# Patient Record
Sex: FEMALE | Race: WHITE | NOT HISPANIC OR LATINO | Employment: FULL TIME | URBAN - METROPOLITAN AREA
[De-identification: names, ages, dates, MRNs, and addresses within clinical notes are randomized per-mention and may not be internally consistent; named-entity substitution may affect disease eponyms.]

---

## 2023-06-12 PROBLEM — R06.02 SHORTNESS OF BREATH: Status: ACTIVE | Noted: 2023-06-12

## 2023-06-12 PROBLEM — L30.1 DYSHIDROTIC FOOT DERMATITIS: Status: ACTIVE | Noted: 2023-06-12

## 2023-06-12 PROBLEM — B37.31 CANDIDIASIS, VAGINA: Status: RESOLVED | Noted: 2023-06-12 | Resolved: 2023-06-12

## 2023-06-12 PROBLEM — F41.9 ANXIETY DISORDER: Status: ACTIVE | Noted: 2023-06-12

## 2023-06-12 PROBLEM — R11.2 NAUSEA AND/OR VOMITING: Status: ACTIVE | Noted: 2023-06-12

## 2023-06-12 RX ORDER — NORELGESTROMIN AND ETHINYL ESTRADIOL 150; 35 UG/D; UG/D
PATCH TRANSDERMAL
COMMUNITY
Start: 2021-07-27

## 2023-06-12 RX ORDER — ONDANSETRON 4 MG/1
TABLET, ORALLY DISINTEGRATING ORAL
COMMUNITY
Start: 2022-02-10

## 2023-06-12 RX ORDER — CLOBETASOL PROPIONATE 0.5 MG/G
OINTMENT TOPICAL
COMMUNITY
Start: 2022-04-20 | End: 2024-02-10 | Stop reason: WASHOUT

## 2023-06-12 RX ORDER — ALBUTEROL SULFATE 90 UG/1
2 AEROSOL, METERED RESPIRATORY (INHALATION) EVERY 4 HOURS PRN
COMMUNITY
Start: 2022-02-07 | End: 2024-02-10 | Stop reason: WASHOUT

## 2023-06-12 RX ORDER — FLUCONAZOLE 150 MG/1
TABLET ORAL
COMMUNITY
Start: 2022-02-07 | End: 2024-02-10 | Stop reason: WASHOUT

## 2023-06-12 RX ORDER — CLINDAMYCIN PHOSPHATE 20 MG/G
CREAM VAGINAL
COMMUNITY
Start: 2022-02-07 | End: 2024-02-10 | Stop reason: WASHOUT

## 2023-06-12 RX ORDER — HYDROCORTISONE 1 %
CREAM (GRAM) TOPICAL
COMMUNITY
Start: 2022-08-16 | End: 2024-02-10 | Stop reason: WASHOUT

## 2023-06-13 ENCOUNTER — OFFICE VISIT (OUTPATIENT)
Dept: PRIMARY CARE | Facility: CLINIC | Age: 23
End: 2023-06-13
Payer: COMMERCIAL

## 2023-06-13 VITALS
SYSTOLIC BLOOD PRESSURE: 130 MMHG | DIASTOLIC BLOOD PRESSURE: 70 MMHG | WEIGHT: 137 LBS | HEIGHT: 67 IN | BODY MASS INDEX: 21.5 KG/M2 | HEART RATE: 83 BPM | OXYGEN SATURATION: 98 % | TEMPERATURE: 97.9 F

## 2023-06-13 DIAGNOSIS — Z23 NEED FOR HEPATITIS B BOOSTER VACCINATION: Primary | ICD-10-CM

## 2023-06-13 PROCEDURE — 90739 HEPB VACC 2/4 DOSE ADULT IM: CPT | Performed by: FAMILY MEDICINE

## 2023-06-13 PROCEDURE — 99212 OFFICE O/P EST SF 10 MIN: CPT | Performed by: FAMILY MEDICINE

## 2023-06-13 PROCEDURE — 90471 IMMUNIZATION ADMIN: CPT | Performed by: FAMILY MEDICINE

## 2023-06-13 ASSESSMENT — ENCOUNTER SYMPTOMS
JOINT SWELLING: 0
BLOOD IN STOOL: 0
VOMITING: 0
SHORTNESS OF BREATH: 0
DECREASED CONCENTRATION: 0
WEAKNESS: 0
FEVER: 0
COUGH: 0
HEADACHES: 0
EYE PAIN: 0
DYSURIA: 0
FATIGUE: 0
NUMBNESS: 0
HALLUCINATIONS: 0
SORE THROAT: 0
NAUSEA: 0
FREQUENCY: 0
ABDOMINAL PAIN: 0
PALPITATIONS: 0
CONFUSION: 0
DIARRHEA: 0
DIZZINESS: 0
HEMATURIA: 0
TROUBLE SWALLOWING: 0
UNEXPECTED WEIGHT CHANGE: 0

## 2023-06-13 ASSESSMENT — PAIN SCALES - GENERAL: PAINLEVEL: 0-NO PAIN

## 2023-06-13 NOTE — PROGRESS NOTES
Subjective   Patient ID: Zoya Savage is a 22 y.o. female.    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        Review of Systems   Constitutional:  Negative for fatigue, fever and unexpected weight change.   HENT:  Negative for congestion, ear pain, hearing loss, sore throat and trouble swallowing.    Eyes:  Negative for pain and visual disturbance.   Respiratory:  Negative for cough and shortness of breath.    Cardiovascular:  Negative for chest pain, palpitations and leg swelling.   Gastrointestinal:  Negative for abdominal pain, blood in stool, diarrhea, nausea and vomiting.   Genitourinary:  Negative for dysuria, frequency, hematuria and urgency.   Musculoskeletal:  Negative for joint swelling.   Skin:  Negative for pallor and rash.   Neurological:  Negative for dizziness, syncope, weakness, numbness and headaches.   Psychiatric/Behavioral:  Negative for confusion, decreased concentration, hallucinations and suicidal ideas.      Vitals:    06/13/23 1251   BP: 130/70   Pulse: 83   Temp: 36.6 °C (97.9 °F)   SpO2: 98%      Objective   Physical Exam  Constitutional:       Appearance: Normal appearance.   Cardiovascular:      Rate and Rhythm: Normal rate and regular rhythm.      Heart sounds: Normal heart sounds.   Pulmonary:      Effort: Pulmonary effort is normal.      Breath sounds: Normal breath sounds.   Musculoskeletal:      Cervical back: Neck supple.   Skin:     General: Skin is warm and dry.   Neurological:      General: No focal deficit present.      Mental Status: She is alert.   Psychiatric:         Mood and Affect: Mood normal.         Speech: Speech normal.         Behavior: Behavior normal.         Cognition and Memory: Cognition normal.         Assessment/Plan   There are no diagnoses linked to this encounter.

## 2023-08-08 ENCOUNTER — OFFICE VISIT (OUTPATIENT)
Dept: PRIMARY CARE | Facility: CLINIC | Age: 23
End: 2023-08-08
Payer: COMMERCIAL

## 2023-08-08 VITALS
BODY MASS INDEX: 21.35 KG/M2 | DIASTOLIC BLOOD PRESSURE: 70 MMHG | HEART RATE: 87 BPM | SYSTOLIC BLOOD PRESSURE: 110 MMHG | WEIGHT: 136 LBS | OXYGEN SATURATION: 97 % | HEIGHT: 67 IN | TEMPERATURE: 97 F

## 2023-08-08 DIAGNOSIS — N89.8 VAGINAL ODOR: ICD-10-CM

## 2023-08-08 LAB
POC APPEARANCE, URINE: CLEAR
POC BILIRUBIN, URINE: NEGATIVE
POC BLOOD, URINE: ABNORMAL
POC COLOR, URINE: ABNORMAL
POC GLUCOSE, URINE: NEGATIVE MG/DL
POC KETONES, URINE: NEGATIVE MG/DL
POC LEUKOCYTES, URINE: NEGATIVE
POC NITRITE,URINE: NEGATIVE
POC PH, URINE: 6 PH
POC PROTEIN, URINE: NEGATIVE MG/DL
POC SPECIFIC GRAVITY, URINE: 1.02
POC UROBILINOGEN, URINE: 0.2 EU/DL

## 2023-08-08 PROCEDURE — 81003 URINALYSIS AUTO W/O SCOPE: CPT | Performed by: INTERNAL MEDICINE

## 2023-08-08 PROCEDURE — 99212 OFFICE O/P EST SF 10 MIN: CPT | Performed by: INTERNAL MEDICINE

## 2023-08-08 RX ORDER — FLUCONAZOLE 150 MG/1
150 TABLET ORAL DAILY
Qty: 3 TABLET | Refills: 0 | Status: SHIPPED | OUTPATIENT
Start: 2023-08-08 | End: 2023-08-11

## 2023-08-08 ASSESSMENT — ENCOUNTER SYMPTOMS
NECK PAIN: 0
POLYPHAGIA: 0
DYSURIA: 0
ABDOMINAL PAIN: 0
FATIGUE: 0
PHOTOPHOBIA: 0
STRIDOR: 0
VOMITING: 0
CONFUSION: 0
APPETITE CHANGE: 0
FREQUENCY: 1
BACK PAIN: 0
WEAKNESS: 0
NERVOUS/ANXIOUS: 0
ACTIVITY CHANGE: 0
CONSTIPATION: 0
VOICE CHANGE: 0
SHORTNESS OF BREATH: 0
NUMBNESS: 0
SPEECH DIFFICULTY: 0
SLEEP DISTURBANCE: 0
SINUS PAIN: 0
POLYDIPSIA: 0
MYALGIAS: 0
SORE THROAT: 0
FEVER: 0
HEADACHES: 0
SEIZURES: 0
HALLUCINATIONS: 0
EYE PAIN: 0
BLOOD IN STOOL: 0
DIARRHEA: 0
FLANK PAIN: 0
WHEEZING: 0
UNEXPECTED WEIGHT CHANGE: 0
ADENOPATHY: 0
PALPITATIONS: 0
NAUSEA: 0
CHEST TIGHTNESS: 0
TROUBLE SWALLOWING: 0
WOUND: 0
DIZZINESS: 0
FACIAL ASYMMETRY: 0

## 2023-08-08 ASSESSMENT — PAIN SCALES - GENERAL: PAINLEVEL: 0-NO PAIN

## 2023-08-08 NOTE — PROGRESS NOTES
"Subjective   Patient ID: Zoya Savage is a 23 y.o. female who presents for UTI (C/o vaginal odor past week, itching, uti concern).    UTI   Associated symptoms include frequency and urgency. Pertinent negatives include no flank pain, nausea or vomiting.      Patient presented to the office for Vaginal odor, itching and frequency.    Review of Systems   Constitutional:  Negative for activity change, appetite change, fatigue, fever and unexpected weight change.   HENT:  Negative for dental problem, ear discharge, hearing loss, nosebleeds, postnasal drip, sinus pain, sore throat, trouble swallowing and voice change.    Eyes:  Negative for photophobia, pain and visual disturbance.   Respiratory:  Negative for chest tightness, shortness of breath, wheezing and stridor.    Cardiovascular:  Negative for chest pain, palpitations and leg swelling.   Gastrointestinal:  Negative for abdominal pain, blood in stool, constipation, diarrhea, nausea and vomiting.   Endocrine: Negative for polydipsia, polyphagia and polyuria.   Genitourinary:  Positive for frequency, urgency and vaginal discharge. Negative for decreased urine volume, dyspareunia, dysuria and flank pain.   Musculoskeletal:  Negative for back pain, gait problem, myalgias and neck pain.   Skin:  Negative for rash and wound.   Allergic/Immunologic: Negative for environmental allergies and food allergies.   Neurological:  Negative for dizziness, seizures, syncope, facial asymmetry, speech difficulty, weakness, numbness and headaches.   Hematological:  Negative for adenopathy.   Psychiatric/Behavioral:  Negative for behavioral problems, confusion, hallucinations, sleep disturbance and suicidal ideas. The patient is not nervous/anxious.      Objective   /70   Pulse 87   Temp 36.1 °C (97 °F)   Ht 1.695 m (5' 6.75\")   Wt 61.7 kg (136 lb)   SpO2 97%   BMI 21.46 kg/m²     Physical Exam  Constitutional:       General: She is not in acute distress.     " Appearance: Normal appearance. She is normal weight. She is not ill-appearing or toxic-appearing.   HENT:      Nose: Nose normal.   Eyes:      Conjunctiva/sclera: Conjunctivae normal.   Pulmonary:      Effort: Pulmonary effort is normal.   Musculoskeletal:         General: Normal range of motion.   Neurological:      General: No focal deficit present.      Mental Status: She is alert and oriented to person, place, and time.   Psychiatric:         Mood and Affect: Mood normal.         Behavior: Behavior normal.         Thought Content: Thought content normal.         Judgment: Judgment normal.       Assessment/Plan   Problem List Items Addressed This Visit    None  Visit Diagnoses       Vaginal odor        Relevant Medications    fluconazole (Diflucan) 150 mg tablet    Other Relevant Orders    POCT UA Automated manually resulted (Completed)  In office UA is negative for UTI. Patient instructed to follow up with OB- GYN and given 3 days of Diflucan.

## 2023-08-24 LAB
CHLAMYDIA TRACH., AMPLIFIED: NEGATIVE
CLUE CELLS: NORMAL
N. GONORRHEA, AMPLIFIED: NEGATIVE
NUGENT SCORE: 2
YEAST: NORMAL

## 2023-12-28 ENCOUNTER — TELEPHONE (OUTPATIENT)
Dept: PRIMARY CARE | Facility: CLINIC | Age: 23
End: 2023-12-28
Payer: COMMERCIAL

## 2023-12-28 NOTE — TELEPHONE ENCOUNTER
Pt called stated that she is having a burning sensation during intercourse and no other sx and wanted to know what she should do? She has appt with GYN on 1/16/2024 but she feels she shouldn't wait that long. Please advise.  No discharge, no odor.

## 2024-01-16 PROCEDURE — 88141 CYTOPATH C/V INTERPRET: CPT | Performed by: PATHOLOGY

## 2024-01-16 PROCEDURE — 87624 HPV HI-RISK TYP POOLED RSLT: CPT

## 2024-01-16 PROCEDURE — 88175 CYTOPATH C/V AUTO FLUID REDO: CPT

## 2024-01-17 ENCOUNTER — LAB REQUISITION (OUTPATIENT)
Dept: LAB | Facility: HOSPITAL | Age: 24
End: 2024-01-17
Payer: COMMERCIAL

## 2024-01-17 DIAGNOSIS — Z01.419 ENCOUNTER FOR GYNECOLOGICAL EXAMINATION (GENERAL) (ROUTINE) WITHOUT ABNORMAL FINDINGS: ICD-10-CM

## 2024-01-17 DIAGNOSIS — Z11.51 ENCOUNTER FOR SCREENING FOR HUMAN PAPILLOMAVIRUS (HPV): ICD-10-CM

## 2024-01-31 ENCOUNTER — OFFICE VISIT (OUTPATIENT)
Dept: PRIMARY CARE | Facility: CLINIC | Age: 24
End: 2024-01-31
Payer: COMMERCIAL

## 2024-01-31 VITALS
SYSTOLIC BLOOD PRESSURE: 120 MMHG | HEART RATE: 65 BPM | OXYGEN SATURATION: 98 % | DIASTOLIC BLOOD PRESSURE: 74 MMHG | HEIGHT: 66 IN | WEIGHT: 133 LBS | TEMPERATURE: 96.8 F | BODY MASS INDEX: 21.38 KG/M2

## 2024-01-31 DIAGNOSIS — M94.0 ACUTE COSTOCHONDRITIS: Primary | ICD-10-CM

## 2024-01-31 PROCEDURE — 99213 OFFICE O/P EST LOW 20 MIN: CPT | Performed by: INTERNAL MEDICINE

## 2024-01-31 RX ORDER — DICLOFENAC SODIUM 50 MG/1
50 TABLET, DELAYED RELEASE ORAL 2 TIMES DAILY
Qty: 14 TABLET | Refills: 0 | Status: SHIPPED | OUTPATIENT
Start: 2024-01-31 | End: 2024-02-07

## 2024-01-31 ASSESSMENT — ENCOUNTER SYMPTOMS
JOINT SWELLING: 0
ADENOPATHY: 0
TREMORS: 0
ACTIVITY CHANGE: 0
COLOR CHANGE: 0
CONSTIPATION: 0
POLYPHAGIA: 0
VOICE CHANGE: 0
STRIDOR: 0
SINUS PAIN: 0
NERVOUS/ANXIOUS: 0
ARTHRALGIAS: 0
WEAKNESS: 0
SLEEP DISTURBANCE: 0
FATIGUE: 0
HALLUCINATIONS: 0
NECK PAIN: 0
UNEXPECTED WEIGHT CHANGE: 0
WOUND: 0
ABDOMINAL PAIN: 0
FEVER: 0
BLOOD IN STOOL: 0
PHOTOPHOBIA: 0
PALPITATIONS: 0
SEIZURES: 0
FLANK PAIN: 0
SHORTNESS OF BREATH: 0
CHEST TIGHTNESS: 0
MYALGIAS: 0
SORE THROAT: 0
SPEECH DIFFICULTY: 0
WHEEZING: 0
COUGH: 1
POLYDIPSIA: 0
VOMITING: 0
EYE PAIN: 0
DIARRHEA: 0
HEADACHES: 0
TROUBLE SWALLOWING: 0
FACIAL ASYMMETRY: 0
CONFUSION: 0
APPETITE CHANGE: 0
BACK PAIN: 0
DIZZINESS: 0
NUMBNESS: 0
NAUSEA: 0
DYSURIA: 0
HEMATURIA: 0

## 2024-01-31 NOTE — LETTER
January 31, 2024     Patient: Zoya Savage   YOB: 2000   Date of Visit: 1/31/2024       To Whom It May Concern:    Zoya Savage was seen in my clinic on 1/31/2024 at 12:30 pm. Please excuse Zoya for her absence from work on this day to make the appointment.    If you have any questions or concerns, please don't hesitate to call.         Sincerely,         Maurice Porter MD        CC: No Recipients

## 2024-01-31 NOTE — PROGRESS NOTES
"Subjective   Patient ID: Zoya Savage is a 23 y.o. female who presents for Chest Pain.    HPI   Patient has on and off cough from past 1 month and few days ago she developed left lower side chest pain which is excruciating with coughing and deep breathing.  Pain is worse with lifting and with any activity involving left hand.    Review of Systems   Constitutional:  Negative for activity change, appetite change, fatigue, fever and unexpected weight change.   HENT:  Negative for dental problem, ear discharge, hearing loss, nosebleeds, postnasal drip, sinus pain, sore throat, trouble swallowing and voice change.    Eyes:  Negative for photophobia, pain and visual disturbance.   Respiratory:  Positive for cough. Negative for chest tightness, shortness of breath, wheezing and stridor.    Cardiovascular:  Positive for chest pain. Negative for palpitations and leg swelling.   Gastrointestinal:  Negative for abdominal pain, blood in stool, constipation, diarrhea, nausea and vomiting.   Endocrine: Negative for polydipsia, polyphagia and polyuria.   Genitourinary:  Negative for decreased urine volume, dyspareunia, dysuria, flank pain, hematuria and urgency.   Musculoskeletal:  Negative for arthralgias, back pain, gait problem, joint swelling, myalgias and neck pain.   Skin:  Negative for color change, rash and wound.   Allergic/Immunologic: Negative for environmental allergies and food allergies.   Neurological:  Negative for dizziness, tremors, seizures, syncope, facial asymmetry, speech difficulty, weakness, numbness and headaches.   Hematological:  Negative for adenopathy.   Psychiatric/Behavioral:  Negative for behavioral problems, confusion, hallucinations, self-injury, sleep disturbance and suicidal ideas. The patient is not nervous/anxious.      Objective   /74   Pulse 65   Temp 36 °C (96.8 °F)   Ht 1.676 m (5' 6\")   Wt 60.3 kg (133 lb)   SpO2 98%   BMI 21.47 kg/m²     Physical Exam  Constitutional:  "      General: She is not in acute distress.     Appearance: She is not ill-appearing or toxic-appearing.   Eyes:      Conjunctiva/sclera: Conjunctivae normal.   Cardiovascular:      Rate and Rhythm: Normal rate and regular rhythm.      Pulses: Normal pulses.      Heart sounds: Normal heart sounds. No murmur heard.  Pulmonary:      Effort: Pulmonary effort is normal. No respiratory distress.      Breath sounds: Normal breath sounds. No stridor. No wheezing, rhonchi or rales.   Chest:      Chest wall: Tenderness present.   Musculoskeletal:         General: No swelling or deformity. Normal range of motion.      Cervical back: Normal range of motion.   Neurological:      General: No focal deficit present.      Mental Status: She is alert and oriented to person, place, and time.   Psychiatric:         Mood and Affect: Mood normal.         Behavior: Behavior normal.       Assessment/Plan   Problem List Items Addressed This Visit    None  Visit Diagnoses       Acute costochondritis    -  Primary    Relevant Medications    diclofenac (Voltaren) 50 mg EC tablet

## 2024-02-01 ENCOUNTER — APPOINTMENT (OUTPATIENT)
Dept: PRIMARY CARE | Facility: CLINIC | Age: 24
End: 2024-02-01
Payer: COMMERCIAL

## 2024-02-02 LAB
CYTOLOGY CMNT CVX/VAG CYTO-IMP: NORMAL
HPV HR 12 DNA GENITAL QL NAA+PROBE: NEGATIVE
HPV HR GENOTYPES PNL CVX NAA+PROBE: NEGATIVE
HPV16 DNA SPEC QL NAA+PROBE: NEGATIVE
HPV18 DNA SPEC QL NAA+PROBE: NEGATIVE
LAB AP HPV GENOTYPE QUESTION: YES
LAB AP HPV HR: NORMAL
LABORATORY COMMENT REPORT: NORMAL
PATH REPORT.TOTAL CANCER: NORMAL

## 2024-02-09 ENCOUNTER — OFFICE VISIT (OUTPATIENT)
Dept: PRIMARY CARE | Facility: CLINIC | Age: 24
End: 2024-02-09
Payer: COMMERCIAL

## 2024-02-09 VITALS
BODY MASS INDEX: 20.72 KG/M2 | SYSTOLIC BLOOD PRESSURE: 100 MMHG | HEIGHT: 67 IN | TEMPERATURE: 97.6 F | WEIGHT: 132 LBS | DIASTOLIC BLOOD PRESSURE: 60 MMHG

## 2024-02-09 DIAGNOSIS — R10.9 FLANK PAIN: Primary | ICD-10-CM

## 2024-02-09 PROCEDURE — 99212 OFFICE O/P EST SF 10 MIN: CPT | Performed by: INTERNAL MEDICINE

## 2024-02-09 ASSESSMENT — PAIN SCALES - GENERAL: PAINLEVEL: 1

## 2024-02-10 PROBLEM — R10.9 ABDOMINAL PAIN: Status: RESOLVED | Noted: 2024-02-10 | Resolved: 2024-02-10

## 2024-02-10 PROBLEM — R10.9 ABDOMINAL PAIN: Status: ACTIVE | Noted: 2024-02-10

## 2024-02-10 ASSESSMENT — ENCOUNTER SYMPTOMS
NAUSEA: 0
COLOR CHANGE: 0
PHOTOPHOBIA: 0
VOICE CHANGE: 0
CHEST TIGHTNESS: 0
WEAKNESS: 0
WHEEZING: 0
NERVOUS/ANXIOUS: 0
HALLUCINATIONS: 0
ARTHRALGIAS: 0
NECK PAIN: 0
CONFUSION: 0
STRIDOR: 0
FLANK PAIN: 0
HEADACHES: 0
FATIGUE: 0
JOINT SWELLING: 0
FREQUENCY: 0
SLEEP DISTURBANCE: 0
SINUS PAIN: 0
HEMATURIA: 0
ADENOPATHY: 0
SHORTNESS OF BREATH: 0
FEVER: 0
DIZZINESS: 0
UNEXPECTED WEIGHT CHANGE: 0
POLYDIPSIA: 0
TREMORS: 0
APPETITE CHANGE: 0
MYALGIAS: 0
EYE PAIN: 0
FACIAL ASYMMETRY: 0
COUGH: 0
SORE THROAT: 0
CONSTIPATION: 0
SPEECH DIFFICULTY: 0
DYSURIA: 0
ACTIVITY CHANGE: 0
PALPITATIONS: 0
TROUBLE SWALLOWING: 0
DIARRHEA: 0
WOUND: 0
NUMBNESS: 0
SEIZURES: 0
BACK PAIN: 0
ABDOMINAL PAIN: 0
VOMITING: 0
POLYPHAGIA: 0
BLOOD IN STOOL: 0

## 2024-02-10 NOTE — PROGRESS NOTES
"Subjective   Patient ID: Zoya Savage is a 23 y.o. female who presents for LEFT SIDE PAIN (Needing paperwork for employer to return because of restrictions).    HPI   Patient presented to the office after a recent ER visit for Left sided flank pain. She was tested in the ER with blood and urine tests and they were normal.  She was given injection of Toradol and pain resolved. No problem since then. She need paperwork to return to work.    Review of Systems   Constitutional:  Negative for activity change, appetite change, fatigue, fever and unexpected weight change.   HENT:  Negative for dental problem, ear discharge, hearing loss, nosebleeds, postnasal drip, sinus pain, sore throat, trouble swallowing and voice change.    Eyes:  Negative for photophobia, pain and visual disturbance.   Respiratory:  Negative for cough, chest tightness, shortness of breath, wheezing and stridor.    Cardiovascular:  Negative for chest pain, palpitations and leg swelling.   Gastrointestinal:  Negative for abdominal pain, blood in stool, constipation, diarrhea, nausea and vomiting.   Endocrine: Negative for polydipsia, polyphagia and polyuria.   Genitourinary:  Negative for decreased urine volume, dyspareunia, dysuria, flank pain, frequency, hematuria and urgency.   Musculoskeletal:  Negative for arthralgias, back pain, gait problem, joint swelling, myalgias and neck pain.   Skin:  Negative for color change, rash and wound.   Allergic/Immunologic: Negative for environmental allergies and food allergies.   Neurological:  Negative for dizziness, tremors, seizures, syncope, facial asymmetry, speech difficulty, weakness, numbness and headaches.   Hematological:  Negative for adenopathy.   Psychiatric/Behavioral:  Negative for behavioral problems, confusion, hallucinations, self-injury, sleep disturbance and suicidal ideas. The patient is not nervous/anxious.      Objective   /60   Temp 36.4 °C (97.6 °F)   Ht 1.695 m (5' 6.75\")  "  Wt 59.9 kg (132 lb)   BMI 20.83 kg/m²     Physical Exam  Constitutional:       General: She is not in acute distress.     Appearance: Normal appearance. She is normal weight. She is not ill-appearing or toxic-appearing.   HENT:      Head: Normocephalic.      Nose: Nose normal.   Eyes:      Conjunctiva/sclera: Conjunctivae normal.   Pulmonary:      Effort: Pulmonary effort is normal.   Abdominal:      General: Bowel sounds are normal.      Tenderness: There is no abdominal tenderness. There is no right CVA tenderness or left CVA tenderness.   Musculoskeletal:         General: Normal range of motion.      Cervical back: Normal range of motion.   Skin:     General: Skin is warm.   Neurological:      General: No focal deficit present.      Mental Status: She is alert and oriented to person, place, and time.   Psychiatric:         Mood and Affect: Mood normal.         Behavior: Behavior normal.       Assessment/Plan   Problem List Items Addressed This Visit          Gastrointestinal and Abdominal    Flank pain - Primary     Flank pain has resolved now.   Paperwork has been signed.

## 2024-02-13 ENCOUNTER — APPOINTMENT (OUTPATIENT)
Dept: PRIMARY CARE | Facility: CLINIC | Age: 24
End: 2024-02-13
Payer: COMMERCIAL

## 2024-10-23 ENCOUNTER — APPOINTMENT (OUTPATIENT)
Dept: PRIMARY CARE | Facility: CLINIC | Age: 24
End: 2024-10-23
Payer: COMMERCIAL

## 2024-11-20 ENCOUNTER — APPOINTMENT (OUTPATIENT)
Dept: PRIMARY CARE | Facility: CLINIC | Age: 24
End: 2024-11-20
Payer: COMMERCIAL

## 2024-11-20 VITALS
HEART RATE: 72 BPM | DIASTOLIC BLOOD PRESSURE: 60 MMHG | OXYGEN SATURATION: 100 % | TEMPERATURE: 96.8 F | SYSTOLIC BLOOD PRESSURE: 100 MMHG | BODY MASS INDEX: 20.56 KG/M2 | HEIGHT: 67 IN | WEIGHT: 131 LBS

## 2024-11-20 DIAGNOSIS — B35.4 TINEA CORPORIS: ICD-10-CM

## 2024-11-20 DIAGNOSIS — Z00.00 ROUTINE GENERAL MEDICAL EXAMINATION AT A HEALTH CARE FACILITY: Primary | ICD-10-CM

## 2024-11-20 PROBLEM — N89.8 VAGINAL ODOR: Status: ACTIVE | Noted: 2024-11-20

## 2024-11-20 PROBLEM — L98.9 INFLAMMATORY DERMATOSIS: Status: ACTIVE | Noted: 2023-06-12

## 2024-11-20 PROBLEM — M94.0 COSTOCHONDRITIS: Status: ACTIVE | Noted: 2024-11-20

## 2024-11-20 PROBLEM — M62.838 MUSCLE SPASMS OF NECK: Status: ACTIVE | Noted: 2024-11-20

## 2024-11-20 PROCEDURE — 3008F BODY MASS INDEX DOCD: CPT | Performed by: FAMILY MEDICINE

## 2024-11-20 PROCEDURE — 99395 PREV VISIT EST AGE 18-39: CPT | Performed by: FAMILY MEDICINE

## 2024-11-20 RX ORDER — NORELGESTROMIN AND ETHINYL ESTRADIOL 35; 150 UG/MG; UG/MG
1 PATCH TRANSDERMAL WEEKLY
Qty: 9 PATCH | Refills: 3 | Status: SHIPPED | OUTPATIENT
Start: 2024-11-20

## 2024-11-20 RX ORDER — KETOCONAZOLE 20 MG/G
CREAM TOPICAL 2 TIMES DAILY
Qty: 45 G | Refills: 0 | Status: SHIPPED | OUTPATIENT
Start: 2024-11-20 | End: 2025-11-20

## 2024-11-20 RX ORDER — OMEPRAZOLE 20 MG/1
20 CAPSULE, DELAYED RELEASE ORAL
COMMUNITY
Start: 2024-02-01

## 2024-11-20 ASSESSMENT — ENCOUNTER SYMPTOMS
FREQUENCY: 0
HEMATURIA: 0
TROUBLE SWALLOWING: 0
DIARRHEA: 0
COUGH: 0
ABDOMINAL PAIN: 0
SHORTNESS OF BREATH: 0
WEAKNESS: 0
VOMITING: 0
UNEXPECTED WEIGHT CHANGE: 0
PALPITATIONS: 0
HALLUCINATIONS: 0
FATIGUE: 0
NUMBNESS: 0
JOINT SWELLING: 0
HEADACHES: 0
CONFUSION: 0
EYE PAIN: 0
DECREASED CONCENTRATION: 0
DIZZINESS: 0
BLOOD IN STOOL: 0
NAUSEA: 0
DYSURIA: 0
FEVER: 0
SORE THROAT: 0

## 2024-11-20 ASSESSMENT — PAIN SCALES - GENERAL: PAINLEVEL_OUTOF10: 0-NO PAIN

## 2024-11-20 ASSESSMENT — PATIENT HEALTH QUESTIONNAIRE - PHQ9
SUM OF ALL RESPONSES TO PHQ9 QUESTIONS 1 AND 2: 0
1. LITTLE INTEREST OR PLEASURE IN DOING THINGS: NOT AT ALL
2. FEELING DOWN, DEPRESSED OR HOPELESS: NOT AT ALL

## 2024-11-20 NOTE — PATIENT INSTRUCTIONS
It was nice to see you today!  Discussed current concerns and addressed   Reviewed recent labs and diagnostics  Reviewed medications list  Continue to eat a healthy diet, exercise at least 3 times a week or more  Plan and follow up discussed  For any further information related to your condition, copy and paste or go to [email or web address]  We will consider Gardasil vaccine

## 2024-11-20 NOTE — PROGRESS NOTES
Subjective   Patient ID: Zoya Savage is a 24 y.o. female.    Zoya Savage comes in today for physical exam.  There has been no chest pain, shortness of breath, fever, chills, unexplained weight loss, rectal bleeding or any other unusual symptoms.  Menstrual cycles normal, on birth control patches.  Recent labs, diagnostics and pertinent information has been reviewed. Patient is attempting to eat a healthy diet and incorporate exercise in to their lifestyle. Patient does not smoke. Alcohol in moderation. Patient is practicing routine eye and dental care. Reviewed employment status.  Recently got a new job in [email or web address] and she is very happy.  No uncontrolled anxiety, depression. Reviewed current medications, if any.          Review of Systems   Constitutional:  Negative for fatigue, fever and unexpected weight change.   HENT:  Negative for congestion, ear pain, hearing loss, sore throat and trouble swallowing.    Eyes:  Negative for pain and visual disturbance.   Respiratory:  Negative for cough and shortness of breath.    Cardiovascular:  Negative for chest pain, palpitations and leg swelling.   Gastrointestinal:  Negative for abdominal pain, blood in stool, diarrhea, nausea and vomiting.   Genitourinary:  Negative for dysuria, frequency, hematuria and urgency.   Musculoskeletal:  Negative for joint swelling.   Skin:  Negative for pallor and rash.   Neurological:  Negative for dizziness, syncope, weakness, numbness and headaches.   Psychiatric/Behavioral:  Negative for confusion, decreased concentration, hallucinations and suicidal ideas.      Vitals:    11/20/24 0814   BP: 100/60   Pulse: 72   Temp: 36 °C (96.8 °F)   SpO2: 100%      Objective   Physical Exam  Constitutional:       Appearance: Normal appearance.   HENT:      Head: Normocephalic and atraumatic.      Right Ear: Tympanic membrane and external ear normal.      Left Ear: Tympanic membrane and external ear normal.      Nose: Nose normal.       Mouth/Throat:      Mouth: Mucous membranes are moist.      Pharynx: Oropharynx is clear. No oropharyngeal exudate.   Eyes:      Extraocular Movements: Extraocular movements intact.      Conjunctiva/sclera: Conjunctivae normal.      Pupils: Pupils are equal, round, and reactive to light.   Cardiovascular:      Rate and Rhythm: Normal rate and regular rhythm.      Heart sounds: Normal heart sounds.   Pulmonary:      Effort: Pulmonary effort is normal.      Breath sounds: Normal breath sounds.   Abdominal:      General: Abdomen is flat.      Palpations: Abdomen is soft. There is no mass.      Tenderness: There is no abdominal tenderness. There is no guarding.   Musculoskeletal:      Cervical back: Neck supple.   Lymphadenopathy:      Cervical: No cervical adenopathy.   Skin:     General: Skin is warm and dry.   Neurological:      General: No focal deficit present.      Mental Status: She is alert.   Psychiatric:         Mood and Affect: Mood normal.         Speech: Speech normal.         Behavior: Behavior normal.         Cognition and Memory: Cognition normal.         Assessment/Plan   Diagnoses and all orders for this visit:  Routine general medical examination at a health care facility  -     CBC and Auto Differential; Future  -     Comprehensive Metabolic Panel; Future  -     Lipid Panel; Future  -     Thyroid Stimulating Hormone; Future  -     Vitamin D 25-Hydroxy,Total (for eval of Vitamin D levels); Future  -     norelgestromin-ethin.estradioL (Xulane) 150-35 mcg/24 hr; Place 1 patch on the skin 1 (one) time per week.  Tinea corporis  -     ketoconazole (NIZOral) 2 % cream; Apply topically 2 times a day. Until rash gone.

## 2025-03-11 PROBLEM — Z23 NEED FOR HEPATITIS B BOOSTER VACCINATION: Status: RESOLVED | Noted: 2023-06-13 | Resolved: 2025-03-11

## 2025-03-11 PROBLEM — R87.612 LGSIL ON PAP SMEAR OF CERVIX: Status: ACTIVE | Noted: 2025-03-11

## 2025-03-11 PROBLEM — Z01.419 WELL WOMAN EXAM WITH ROUTINE GYNECOLOGICAL EXAM: Chronic | Status: ACTIVE | Noted: 2025-03-11

## 2025-03-11 PROBLEM — R11.2 NAUSEA AND VOMITING: Status: RESOLVED | Noted: 2023-06-12 | Resolved: 2025-03-11

## 2025-03-11 PROBLEM — M94.0 COSTOCHONDRITIS: Status: RESOLVED | Noted: 2024-11-20 | Resolved: 2025-03-11

## 2025-03-11 PROBLEM — R06.02 SHORTNESS OF BREATH: Status: RESOLVED | Noted: 2023-06-12 | Resolved: 2025-03-11

## 2025-03-11 PROBLEM — R10.9 FLANK PAIN: Status: RESOLVED | Noted: 2024-02-10 | Resolved: 2025-03-11

## 2025-03-11 PROBLEM — N89.8 VAGINAL ODOR: Status: RESOLVED | Noted: 2024-11-20 | Resolved: 2025-03-11

## 2025-03-11 PROBLEM — M62.838 MUSCLE SPASMS OF NECK: Status: RESOLVED | Noted: 2024-11-20 | Resolved: 2025-03-11

## 2025-03-12 ENCOUNTER — APPOINTMENT (OUTPATIENT)
Facility: CLINIC | Age: 25
End: 2025-03-12
Payer: COMMERCIAL

## 2025-03-12 VITALS
SYSTOLIC BLOOD PRESSURE: 118 MMHG | DIASTOLIC BLOOD PRESSURE: 72 MMHG | WEIGHT: 128 LBS | HEIGHT: 67 IN | BODY MASS INDEX: 20.09 KG/M2

## 2025-03-12 DIAGNOSIS — R19.00 PELVIC MASS IN FEMALE: ICD-10-CM

## 2025-03-12 DIAGNOSIS — Z00.00 ROUTINE GENERAL MEDICAL EXAMINATION AT A HEALTH CARE FACILITY: ICD-10-CM

## 2025-03-12 DIAGNOSIS — Z01.419 WELL WOMAN EXAM WITH ROUTINE GYNECOLOGICAL EXAM: Primary | Chronic | ICD-10-CM

## 2025-03-12 DIAGNOSIS — Z30.45 ENCOUNTER FOR SURVEILLANCE OF TRANSDERMAL PATCH HORMONAL CONTRACEPTIVE DEVICE: ICD-10-CM

## 2025-03-12 DIAGNOSIS — F41.1 GENERALIZED ANXIETY DISORDER: ICD-10-CM

## 2025-03-12 LAB
25(OH)D3+25(OH)D2 SERPL-MCNC: 30 NG/ML (ref 30–100)
ALBUMIN SERPL-MCNC: 4.3 G/DL (ref 3.6–5.1)
ALP SERPL-CCNC: 51 U/L (ref 31–125)
ALT SERPL-CCNC: 9 U/L (ref 6–29)
ANION GAP SERPL CALCULATED.4IONS-SCNC: 10 MMOL/L (CALC) (ref 7–17)
AST SERPL-CCNC: 14 U/L (ref 10–30)
BASOPHILS # BLD AUTO: 85 CELLS/UL (ref 0–200)
BASOPHILS NFR BLD AUTO: 0.7 %
BILIRUB SERPL-MCNC: 0.4 MG/DL (ref 0.2–1.2)
BUN SERPL-MCNC: 7 MG/DL (ref 7–25)
CALCIUM SERPL-MCNC: 9.6 MG/DL (ref 8.6–10.2)
CHLORIDE SERPL-SCNC: 104 MMOL/L (ref 98–110)
CHOLEST SERPL-MCNC: 210 MG/DL
CHOLEST/HDLC SERPL: 2.4 (CALC)
CO2 SERPL-SCNC: 24 MMOL/L (ref 20–32)
CREAT SERPL-MCNC: 0.58 MG/DL (ref 0.5–0.96)
EGFRCR SERPLBLD CKD-EPI 2021: 130 ML/MIN/1.73M2
EOSINOPHIL # BLD AUTO: 73 CELLS/UL (ref 15–500)
EOSINOPHIL NFR BLD AUTO: 0.6 %
ERYTHROCYTE [DISTWIDTH] IN BLOOD BY AUTOMATED COUNT: 13 % (ref 11–15)
GLUCOSE SERPL-MCNC: 85 MG/DL (ref 65–99)
HCT VFR BLD AUTO: 40.4 % (ref 35–45)
HDLC SERPL-MCNC: 87 MG/DL
HGB BLD-MCNC: 13.1 G/DL (ref 11.7–15.5)
LDLC SERPL CALC-MCNC: 95 MG/DL (CALC)
LYMPHOCYTES # BLD AUTO: 2517 CELLS/UL (ref 850–3900)
LYMPHOCYTES NFR BLD AUTO: 20.8 %
MCH RBC QN AUTO: 27.5 PG (ref 27–33)
MCHC RBC AUTO-ENTMCNC: 32.4 G/DL (ref 32–36)
MCV RBC AUTO: 84.7 FL (ref 80–100)
MONOCYTES # BLD AUTO: 581 CELLS/UL (ref 200–950)
MONOCYTES NFR BLD AUTO: 4.8 %
NEUTROPHILS # BLD AUTO: 8845 CELLS/UL (ref 1500–7800)
NEUTROPHILS NFR BLD AUTO: 73.1 %
NONHDLC SERPL-MCNC: 123 MG/DL (CALC)
PLATELET # BLD AUTO: 280 THOUSAND/UL (ref 140–400)
PMV BLD REES-ECKER: 11.9 FL (ref 7.5–12.5)
POTASSIUM SERPL-SCNC: 4.1 MMOL/L (ref 3.5–5.3)
PROT SERPL-MCNC: 7 G/DL (ref 6.1–8.1)
RBC # BLD AUTO: 4.77 MILLION/UL (ref 3.8–5.1)
SODIUM SERPL-SCNC: 138 MMOL/L (ref 135–146)
TRIGL SERPL-MCNC: 185 MG/DL
TSH SERPL-ACNC: 2.47 MIU/L
WBC # BLD AUTO: 12.1 THOUSAND/UL (ref 3.8–10.8)

## 2025-03-12 PROCEDURE — 3008F BODY MASS INDEX DOCD: CPT | Performed by: OBSTETRICS & GYNECOLOGY

## 2025-03-12 PROCEDURE — 99395 PREV VISIT EST AGE 18-39: CPT | Performed by: OBSTETRICS & GYNECOLOGY

## 2025-03-12 RX ORDER — NORELGESTROMIN AND ETHINYL ESTRADIOL 35; 150 UG/MG; UG/MG
1 PATCH TRANSDERMAL WEEKLY
Qty: 9 PATCH | Refills: 3 | Status: SHIPPED | OUTPATIENT
Start: 2025-03-12

## 2025-03-12 ASSESSMENT — COLUMBIA-SUICIDE SEVERITY RATING SCALE - C-SSRS
2. HAVE YOU ACTUALLY HAD ANY THOUGHTS OF KILLING YOURSELF?: NO
6. HAVE YOU EVER DONE ANYTHING, STARTED TO DO ANYTHING, OR PREPARED TO DO ANYTHING TO END YOUR LIFE?: NO
1. IN THE PAST MONTH, HAVE YOU WISHED YOU WERE DEAD OR WISHED YOU COULD GO TO SLEEP AND NOT WAKE UP?: NO

## 2025-03-12 ASSESSMENT — PATIENT HEALTH QUESTIONNAIRE - PHQ9
2. FEELING DOWN, DEPRESSED OR HOPELESS: NOT AT ALL
SUM OF ALL RESPONSES TO PHQ9 QUESTIONS 1 AND 2: 0
1. LITTLE INTEREST OR PLEASURE IN DOING THINGS: NOT AT ALL

## 2025-03-12 ASSESSMENT — PAIN SCALES - GENERAL: PAINLEVEL_OUTOF10: 0-NO PAIN

## 2025-03-12 NOTE — PROGRESS NOTES
Annual Well Women History and Physical  Subjective   Zoya Savage is a 24 y.o. female who is here for a routine exam. Periods are regular every 28-30 days , lasting 4 days with average flow. Dysmenorrhea: none . Cyclic symptoms include none . No intermenstrual bleeding, spotting, or discharge.  She is sexually active and uses birthcontrol: contraceptive patch for pregnancy prevention  She currently  has no GYN concerns.    Well Women Screening history:  Cervical  : LGSIL  : LGSIL  Breast    Colon    Obstetrical History   OB History    Para Term  AB Living   0 0 0 0 0 0   SAB IAB Ectopic Multiple Live Births   0 0 0 0 0       Past Medical History  Past Medical History:   Diagnosis Date    Shortness of breath 2023        Past Surgical History   History reviewed. No pertinent surgical history.    Social History  Social History     Tobacco Use    Smoking status: Former     Types: Cigarettes    Smokeless tobacco: Not on file   Substance Use Topics    Alcohol use: Yes     Comment: social     Substance and Sexual Activity   Drug Use Never       Allergies  Patient has no known allergies.     Medications  Current Outpatient Medications on File Prior to Visit   Medication Sig Dispense Refill    ondansetron ODT (Zofran-ODT) 4 mg disintegrating tablet Take by mouth.      [DISCONTINUED] norelgestromin-ethin.estradioL (Xulane) 150-35 mcg/24 hr Place 1 patch on the skin 1 (one) time per week. 9 patch 3    [DISCONTINUED] omeprazole (PriLOSEC) 20 mg DR capsule Take 1 capsule (20 mg) by mouth once daily.      ketoconazole (NIZOral) 2 % cream Apply topically 2 times a day. Until rash gone. (Patient not taking: Reported on 3/12/2025) 45 g 0     No current facility-administered medications on file prior to visit.         REVIEW OF SYSTEMS  Review of Systems  Constitutional: no fever, no chills, no recent weight gain, no recent weight loss and no fatigue.   Eyes: no eye pain, no vision problems and no  "dryness of the eyes.   ENT: no hearing loss, no nosebleeds and no sinus congestion.   Cardiovascular: no chest pain, no palpitations and no orthopnea.   Respiratory: no shortness of breath, no cough and no wheezing.   Gastrointestinal: no abdominal pain, no constipation, no nausea, no diarrhea and no vomiting.   Genitourinary: no dysuria, no urinary incontinence, no vaginal dryness, no vaginal itching, no dyspareunia, no pelvic pain, no dysmenorrhea, no sexual problems, no change in urinary frequency, no vaginal discharge, no unexplained vaginal bleeding and no lesion/sore.   Musculoskeletal: no back pain, no joint swelling and no leg edema.   Integumentary: no rashes, no skin lesions, no nipple discharge, no breast pain and no breast lump.   Neurological: no headache, no numbness and no dizziness.   Psychiatric: no sleep disturbances, no anxiety and no depression.   Endocrine: no hot flashes, no loss of hair and no hirsutism.   Hematologic/Lymphatic: no swollen glands, no tendency for easy bleeding and no tendency for easy bruising.       Objective   PHYSICAL EXAM  /72   Ht 1.702 m (5' 7\")   Wt 58.1 kg (128 lb)   LMP 02/17/2025 (Approximate)   BMI 20.05 kg/m²     General:   Psychiatric:  Alert and oriented x 3   Appropriate mood and affect   Heart:  Thyroid: Regular rate, rhythm  Euthyroid, normal shape and size   Lungs:  Breast: Clear to auscultation bilaterally  Symmetrical, no skin changes/nipple discharge, redness, tenderness, no masses palpated bilaterally   Abdomen: Soft, non tender   Vulva: EGBUS normal, no lesions   Vagina: Pink, normal discharge   Cervix: No CMT   Uterus: Normal shape, size   Adnexa:  Perineum/Perianal:  Extremities: NT bilaterally, approx. 5 cm fullness on right  No skin Lesions, Hemorrhoids  Full ROM, No edema     Assessment/Plan   Zoya Savage is a 24 y.o. female presents for well women exam and no new problems or risk factors were identified.  Discussed current " recommendations for cervical cancer screening and breast cancer screening including self breast awareness and mammography.  Diet , exercise and daily vitamin encouraged.  -same boyfriend x 1.5 yrs.  -fullness on right side on pelvic exam, will get pelvic US to better assess  -LGSIL on pap 2023,2024.  Repeat today  -doing well on contraceptive patch    Diagnoses and all orders for this visit:  Well woman exam with routine gynecological exam  -     THINPREP PAP TEST  Generalized anxiety disorder  Encounter for surveillance of transdermal patch hormonal contraceptive device  Pelvic mass in female  -     US PELVIS TRANSABDOMINAL WITH TRANSVAGINAL; Future  Routine general medical examination at a health care facility  -     norelgestromin-ethin.estradioL (Xulane) 150-35 mcg/24 hr; Place 1 patch on the skin 1 (one) time per week.      Osman Campbell MD     Thank you for coming to see me for your visit today and most importantly thank you for having a preventative visit.  If lab work was sent, you will see your test result via [facility].  Feel free to call and discuss results you can do not understand.  Any prescriptions will be sent electronically to your pharmacy listed    I look forward to seeing you next year for your health care needs.  Please remember:   Sleep is important - make it a priority for at least 6 hours per night!   Exercise such as walking for 20 minutes per day is beneficial to your physical and mental health   Healthy habits lead to a healthy life.

## 2025-03-25 LAB
CYTOLOGY CMNT CVX/VAG CYTO-IMP: NORMAL
LAB AP HPV GENOTYPE QUESTION: YES
LAB AP HPV HR: NORMAL
LABORATORY COMMENT REPORT: NORMAL
MENSTRUAL HX REPORTED: NORMAL
PATH REPORT.TOTAL CANCER: NORMAL

## 2025-04-08 ENCOUNTER — HOSPITAL ENCOUNTER (OUTPATIENT)
Dept: RADIOLOGY | Facility: HOSPITAL | Age: 25
Discharge: HOME | End: 2025-04-08
Payer: COMMERCIAL

## 2025-04-08 DIAGNOSIS — R19.00 PELVIC MASS IN FEMALE: ICD-10-CM

## 2025-04-08 PROCEDURE — 76830 TRANSVAGINAL US NON-OB: CPT | Performed by: STUDENT IN AN ORGANIZED HEALTH CARE EDUCATION/TRAINING PROGRAM

## 2025-04-08 PROCEDURE — 76856 US EXAM PELVIC COMPLETE: CPT | Performed by: STUDENT IN AN ORGANIZED HEALTH CARE EDUCATION/TRAINING PROGRAM

## 2025-04-08 PROCEDURE — 76830 TRANSVAGINAL US NON-OB: CPT

## 2025-04-09 ENCOUNTER — TELEPHONE (OUTPATIENT)
Facility: CLINIC | Age: 25
End: 2025-04-09
Payer: COMMERCIAL

## 2025-04-09 NOTE — TELEPHONE ENCOUNTER
Reviewed pelvic US results with Dr. Campbell. Both left and right ovary normal, no masses seen. Likely bowel that she felt on exam. US reassuring, and appears benign. Pt informed.  DARIN Murguia PCNA

## 2025-08-07 ENCOUNTER — TELEPHONE (OUTPATIENT)
Facility: CLINIC | Age: 25
End: 2025-08-07
Payer: COMMERCIAL

## 2025-08-07 NOTE — TELEPHONE ENCOUNTER
Getting sick every time she puts her patch on after her period.   Never had a problem before.  This has been happening every month for 6 months.

## 2025-09-16 ENCOUNTER — APPOINTMENT (OUTPATIENT)
Facility: CLINIC | Age: 25
End: 2025-09-16
Payer: COMMERCIAL

## 2026-04-22 ENCOUNTER — APPOINTMENT (OUTPATIENT)
Facility: CLINIC | Age: 26
End: 2026-04-22
Payer: COMMERCIAL